# Patient Record
Sex: FEMALE | Race: OTHER | HISPANIC OR LATINO | URBAN - METROPOLITAN AREA
[De-identification: names, ages, dates, MRNs, and addresses within clinical notes are randomized per-mention and may not be internally consistent; named-entity substitution may affect disease eponyms.]

---

## 2021-01-07 ENCOUNTER — EMERGENCY (EMERGENCY)
Facility: HOSPITAL | Age: 56
LOS: 1 days | Discharge: HOME | End: 2021-01-07
Attending: EMERGENCY MEDICINE
Payer: MEDICAID

## 2021-01-07 VITALS
RESPIRATION RATE: 16 BRPM | HEIGHT: 60 IN | DIASTOLIC BLOOD PRESSURE: 60 MMHG | OXYGEN SATURATION: 98 % | TEMPERATURE: 99 F | WEIGHT: 134.92 LBS | HEART RATE: 77 BPM | SYSTOLIC BLOOD PRESSURE: 120 MMHG

## 2021-01-07 PROCEDURE — 99283 EMERGENCY DEPT VISIT LOW MDM: CPT

## 2021-01-07 NOTE — ED PROVIDER NOTE - NSFOLLOWUPINSTRUCTIONS_ED_ALL_ED_FT
normal e3ye pressure/pt refered to opthalmology but pt declined.will follow up arranged by [facility] coordinator. with her pcp.pt counseled,told to avoid bright light/topical artificial tears

## 2021-01-07 NOTE — ED PROVIDER NOTE - PATIENT PORTAL LINK FT
You can access the FollowMyHealth Patient Portal offered by [facility] by registering at the following website: [email or web address]. By joining [email or web address]’s FollowMyHealth portal, you will also be able to view your health information using other applications (apps) compatible with our system.

## 2024-03-15 ENCOUNTER — EMERGENCY (EMERGENCY)
Facility: HOSPITAL | Age: 59
LOS: 1 days | Discharge: HOME | End: 2024-03-15
Attending: STUDENT IN AN ORGANIZED HEALTH CARE EDUCATION/TRAINING PROGRAM
Payer: MEDICAID

## 2024-03-15 VITALS
OXYGEN SATURATION: 98 % | HEART RATE: 75 BPM | WEIGHT: 123.9 LBS | DIASTOLIC BLOOD PRESSURE: 66 MMHG | TEMPERATURE: 98 F | SYSTOLIC BLOOD PRESSURE: 101 MMHG | RESPIRATION RATE: 16 BRPM

## 2024-03-15 LAB
ALBUMIN SERPL ELPH-MCNC: 3.7 G/DL — SIGNIFICANT CHANGE UP (ref 3.5–5)
ALP SERPL-CCNC: 117 U/L — SIGNIFICANT CHANGE UP (ref 40–120)
ALT FLD-CCNC: 31 U/L DA — SIGNIFICANT CHANGE UP (ref 10–60)
ANION GAP SERPL CALC-SCNC: 3 MMOL/L — LOW (ref 5–17)
AST SERPL-CCNC: 43 U/L — HIGH (ref 10–40)
BASOPHILS # BLD AUTO: 0.06 K/UL — SIGNIFICANT CHANGE UP (ref 0–0.2)
BASOPHILS NFR BLD AUTO: 1.2 % — SIGNIFICANT CHANGE UP (ref 0–2)
BILIRUB SERPL-MCNC: 0.8 MG/DL — SIGNIFICANT CHANGE UP (ref 0.2–1.2)
BUN SERPL-MCNC: 13 MG/DL — SIGNIFICANT CHANGE UP (ref 7–18)
CALCIUM SERPL-MCNC: 9.5 MG/DL — SIGNIFICANT CHANGE UP (ref 8.4–10.5)
CHLORIDE SERPL-SCNC: 106 MMOL/L — SIGNIFICANT CHANGE UP (ref 96–108)
CO2 SERPL-SCNC: 29 MMOL/L — SIGNIFICANT CHANGE UP (ref 22–31)
CREAT SERPL-MCNC: 0.79 MG/DL — SIGNIFICANT CHANGE UP (ref 0.5–1.3)
EGFR: 88 ML/MIN/1.73M2 — SIGNIFICANT CHANGE UP
EOSINOPHIL # BLD AUTO: 0.07 K/UL — SIGNIFICANT CHANGE UP (ref 0–0.5)
EOSINOPHIL NFR BLD AUTO: 1.4 % — SIGNIFICANT CHANGE UP (ref 0–6)
GLUCOSE SERPL-MCNC: 103 MG/DL — HIGH (ref 70–99)
HCT VFR BLD CALC: 41.5 % — SIGNIFICANT CHANGE UP (ref 34.5–45)
HGB BLD-MCNC: 14.4 G/DL — SIGNIFICANT CHANGE UP (ref 11.5–15.5)
IMM GRANULOCYTES NFR BLD AUTO: 0.2 % — SIGNIFICANT CHANGE UP (ref 0–0.9)
LIDOCAIN IGE QN: 31 U/L — SIGNIFICANT CHANGE UP (ref 13–75)
LYMPHOCYTES # BLD AUTO: 1.72 K/UL — SIGNIFICANT CHANGE UP (ref 1–3.3)
LYMPHOCYTES # BLD AUTO: 33.3 % — SIGNIFICANT CHANGE UP (ref 13–44)
MCHC RBC-ENTMCNC: 31.4 PG — SIGNIFICANT CHANGE UP (ref 27–34)
MCHC RBC-ENTMCNC: 34.7 GM/DL — SIGNIFICANT CHANGE UP (ref 32–36)
MCV RBC AUTO: 90.4 FL — SIGNIFICANT CHANGE UP (ref 80–100)
MONOCYTES # BLD AUTO: 0.38 K/UL — SIGNIFICANT CHANGE UP (ref 0–0.9)
MONOCYTES NFR BLD AUTO: 7.4 % — SIGNIFICANT CHANGE UP (ref 2–14)
NEUTROPHILS # BLD AUTO: 2.93 K/UL — SIGNIFICANT CHANGE UP (ref 1.8–7.4)
NEUTROPHILS NFR BLD AUTO: 56.5 % — SIGNIFICANT CHANGE UP (ref 43–77)
NRBC # BLD: 0 /100 WBCS — SIGNIFICANT CHANGE UP (ref 0–0)
NT-PROBNP SERPL-SCNC: 30 PG/ML — SIGNIFICANT CHANGE UP (ref 0–125)
PLATELET # BLD AUTO: 283 K/UL — SIGNIFICANT CHANGE UP (ref 150–400)
POTASSIUM SERPL-MCNC: 4.5 MMOL/L — SIGNIFICANT CHANGE UP (ref 3.5–5.3)
POTASSIUM SERPL-SCNC: 4.5 MMOL/L — SIGNIFICANT CHANGE UP (ref 3.5–5.3)
PROT SERPL-MCNC: 7.6 G/DL — SIGNIFICANT CHANGE UP (ref 6–8.3)
RBC # BLD: 4.59 M/UL — SIGNIFICANT CHANGE UP (ref 3.8–5.2)
RBC # FLD: 12.7 % — SIGNIFICANT CHANGE UP (ref 10.3–14.5)
SODIUM SERPL-SCNC: 138 MMOL/L — SIGNIFICANT CHANGE UP (ref 135–145)
TROPONIN I, HIGH SENSITIVITY RESULT: 4.6 NG/L — SIGNIFICANT CHANGE UP
WBC # BLD: 5.17 K/UL — SIGNIFICANT CHANGE UP (ref 3.8–10.5)
WBC # FLD AUTO: 5.17 K/UL — SIGNIFICANT CHANGE UP (ref 3.8–10.5)

## 2024-03-15 PROCEDURE — 99285 EMERGENCY DEPT VISIT HI MDM: CPT

## 2024-03-15 PROCEDURE — 71046 X-RAY EXAM CHEST 2 VIEWS: CPT | Mod: 26

## 2024-03-15 RX ORDER — ACETAMINOPHEN 500 MG
650 TABLET ORAL ONCE
Refills: 0 | Status: COMPLETED | OUTPATIENT
Start: 2024-03-15 | End: 2024-03-15

## 2024-03-15 RX ORDER — KETOROLAC TROMETHAMINE 30 MG/ML
15 SYRINGE (ML) INJECTION ONCE
Refills: 0 | Status: DISCONTINUED | OUTPATIENT
Start: 2024-03-15 | End: 2024-03-15

## 2024-03-15 RX ADMIN — Medication 650 MILLIGRAM(S): at 11:02

## 2024-03-15 RX ADMIN — Medication 15 MILLIGRAM(S): at 11:02

## 2024-03-15 NOTE — ED ADULT NURSE NOTE - NSFALLRISK_ED_ALL_ED
Subjective   Patient ID: Triny is a 49 year old female.    Chief Complaint   Patient presents with   • Follow-up Hypertension     c/o frequency urination,dry mouth   • Asthma     Patient with HTN and asthma here for follow up. She feels well with the exception of urinary frequency and increased thirst    Asthma   Her past medical history is significant for asthma.       Triny has Acute upper respiratory infection; Essential hypertension; Pelvic pain in female; Adenomyosis; Status post laparoscopic hysterectomy; and Mild intermittent asthma without complication on their problem list.    Review of Systems   Constitutional: Negative.    HENT: Negative.    Eyes: Negative.    Respiratory: Negative.    Cardiovascular: Negative.    Gastrointestinal: Negative.    Endocrine: Negative.    Genitourinary: Negative.    Musculoskeletal: Negative.    Allergic/Immunologic: Negative.    Neurological: Negative.    Hematological: Negative.    Psychiatric/Behavioral: Negative.        Objective   Physical Exam  Constitutional:       Appearance: She is well-developed.   HENT:      Head: Normocephalic.      Right Ear: External ear normal.      Left Ear: External ear normal.   Eyes:      Pupils: Pupils are equal, round, and reactive to light.   Neck:      Musculoskeletal: Normal range of motion.   Cardiovascular:      Rate and Rhythm: Regular rhythm.   Pulmonary:      Effort: Pulmonary effort is normal.   Abdominal:      Palpations: Abdomen is soft.   Skin:     General: Skin is warm.   Neurological:      Mental Status: She is oriented to person, place, and time.         Assessment   NIDDM: new onset  
No

## 2024-03-15 NOTE — ED ADULT NURSE NOTE - NS ED NURSE IV DC DT
Discharge Call Back Questionnaire   How are you feeling? Any questions or concerns?   When did you get your medications? (if prescribed new medications)   When is your follow-up appointment? Do you have a ride to get to your appointment?   Do you have enough of your daily medications until your next doctor appointment?   Are you weighing yourself at home? Any changes in weight?   Have you experienced any of the following symptoms- more shortness of breath than usual, difficulty breathing when lying down, a dry hacking cough, new or worsening confusion, having difficulty thinking clearly?  DID NOT CALL. DC TO [facility].  
15-Mar-2024 13:11

## 2024-03-15 NOTE — ED PROVIDER NOTE - CLINICAL SUMMARY MEDICAL DECISION MAKING FREE TEXT BOX
58-year-old female, history of hypothyroidism, currently being treated for H pylori presenting with chief complaint of chest pain that started this morning. EKG NSR, non ischemic. VSS, afebrile.   Cardiac workup, chest x-ray.

## 2024-03-15 NOTE — ED PROVIDER NOTE - PHYSICAL EXAMINATION
Gen: NAD; well appearing  ENT: mucous membranes moist, no discharge  Neck: neck supple  Resp: CTAB, no W/R/R  CV: RRR, +S1/S2, no M/R/G  GI: Abdomen soft non-distended, NTTP, no masses  MSK: No open wounds, no bruising, no lower extremity edema  Neuro: A&Ox4, sensation nl, motor 5/5 RUE/LUE/RLE/LLE, follows commands  Ext: no edema, no deformity, warm and well-perfused  Skin: no rash or bruising

## 2024-03-15 NOTE — ED PROVIDER NOTE - NSFOLLOWUPCLINICS_GEN_ALL_ED_FT
[facility]  Otolaryngology (ENT)  [address], [address]  [place] [address]  Phone: [phone]  Fax:     [facility] - ENT  Otolaryngology (ENT)  [address] [place] [address]  Phone: [phone]  Fax:     [facility]  Otolaryngology (ENT)  [address], [facility] - 10th [place] [address]  Phone: [phone]  Fax:

## 2024-03-15 NOTE — ED PROVIDER NOTE - NSFOLLOWUPINSTRUCTIONS_ED_ALL_ED_FT
– Regrese si hay algún empeoramiento o síntomas preocupantes (alejandro más abajo).  – Seguimiento con médico de atención primaria en los próximos 5 a 7 días.  – Seguimiento con cardiólogo los próximos 2 a 3 días.  – Seguimiento con otorrinolaringólogo en los próximos 2 a 3 días.  El dolor de pecho también puede ser causado por afecciones potencialmente mortales, eleonora un ataque cardíaco o un coágulo de debbi en los pulmones.    INSTRUCCIONES DE DESCARGA:  Llame al 911 si:  •Tiene alguno de los siguientes signos de un ataque cardíaco: ?Opresión, presión o dolor en el pecho.  También puede tener cualquiera de los siguientes síntomas: Malestar o dolor en la espalda, el norma, la mandíbula, el estómago o el brazo.  ?Dificultad para respirar  ?Náuseas o vómitos  ?Aturdimiento o sudor frío repentino  Busque atención inmediatamente si:  •Tiene malestar en el pecho que empeora, incluso con medicamentos.  •Tose o vomita debbi.  •Liliana evacuaciones intestinales son negras o con debbi.  •No puede dejar de vomitar o le duele tragar.  Comuníquese con montilla proveedor de atención médica si:  •Tiene preguntas o inquietudes sobre montilla condición o atención.  Medicamentos:  •Es posible que le administren medicamentos para tratar la causa de montilla dolor en el pecho. Los ejemplos incluyen analgésicos, medicamentos para la ansiedad o medicamentos para aumentar el flujo sanguíneo al corazón.  •No tome ciertos medicamentos sin consultar haris con montilla proveedor de atención médica. Estos incluyen SONALI, suplementos herbales o vitamínicos u hormonas (estrógeno o progestina).  •[place] montilla medicamento según las indicaciones. Comuníquese con montilla proveedor de atención médica si silvio que montilla medicamento no le está ayudando o si tiene efectos secundarios. Dígale si es alérgico a algún medicamento. Mantenga taylor lista de los medicamentos, vitaminas y hierbas que camryn. Incluya las cantidades y cuándo y por qué las camryn. Lleve la lista o los frascos de pastillas a las visitas de seguimiento. Lleve consigo montilla lista de medicamentos en carlos alberto de taylor emergencia.  Ilene un seguimiento con montilla proveedor de atención médica dentro de las 72 horas, o según las indicaciones: Es posible que deba regresar para realizarse más pruebas para encontrar la causa de montilla dolor en el pecho. Es posible que lo deriven a un especialista, eleonora un cardiólogo o un gastroenterólogo. Escriba liliana preguntas para recordar hacerlas caesar liliana visitas.  Consejos para taylor sergio saludable: Las siguientes son pautas generales para taylor sergio saludable. Si montilla dolor de pecho es causado por un problema cardíaco, montilla proveedor de atención médica le dará pautas específicas a seguir.  •No fume. La nicotina y otras sustancias químicas de los cigarrillos y los puros pueden provocar daños pulmonares y cardíacos. Pídale información a montilla proveedor de atención médica si actualmente fuma y necesita ayuda para dejar de fumar. Los cigarrillos electrónicos o el tabaco sin humo todavía contienen nicotina. Hable con montilla proveedor de atención médica antes de usar estos productos.  •Coma taylor variedad de alimentos saludables, bajos en grasa y bajos en sal. Los alimentos saludables incluyen frutas, verduras, panes integrales, productos lácteos bajos en grasa, frijoles, jessica magras y pescado. Solicite más información sobre taylor dieta saludable para el corazón.  Monica mucha agua todos los días. Tu cuerpo está compuesto principalmente de agua. El agua ayuda al cuerpo a controlar la temperatura y la presión arterial. Pregúntele a montilla proveedor de atención médica cuánta agua debe beber todos los días.  •Preguntar sobre la actividad. Montilla proveedor de atención médica le indicará qué actividades limitar o evitar. Pregunte cuándo puede conducir, regresar al trabajo y tener relaciones sexuales. Pregunta por el mejor plan de ejercicios para ti.  •Mantener un peso saludable. Pregúntele a montilla proveedor de atención médica cuánto debe pesar. Pídale que le ayude a crear un plan de pérdida de peso si tiene sobrepeso.  Si tiene un stent:  •Lleve consigo montilla tarjeta de stent en todo momento.  •Informe a todos los proveedores de atención médica que tiene un stent.      [facility]  General Internal Medicine  [address] [place] [address]  Phone: [phone]  Fax:     [facility]  Internal Medicine  [address] [place] [address]  Phone: [phone]  Fax: [phone]    [facility]  Cardiology  [address] [place] [id number]  Phone: [phone]

## 2024-03-15 NOTE — ED PROVIDER NOTE - PROGRESS NOTE DETAILS
Ayad RAMOS:   Patient reassessed at bedside, feeling well.  Denies any active chest pain.   EKG normal sinus rhythm, negative troponin.  Normal chest x-ray.  Will discharge.

## 2024-03-15 NOTE — ED PROVIDER NOTE - OBJECTIVE STATEMENT
58-year-old female, history of hypothyroidism presenting with chief complaint of chest pain that started this morning.  Patient states that pain is left-sided, and lasted for around 30 minutes, was described as sharp.  Patient denies any chest pain  currently.  She is on a course of Flagyl, clarithromycin, and omeprazole for H. pylori.  She is denying any shortness of breath.  States minor diarrhea.   Endorses minor left mandibular pain,  denies any neck stiffness.  No nausea, vomiting, fever or chills, no abdominal pain.

## 2024-03-15 NOTE — ED PROVIDER NOTE - PATIENT PORTAL LINK FT
You can access the FollowMyHealth Patient Portal offered by [facility] by registering at the following website: [email or web address]. By joining [employer]’s FollowMyHealth portal, you will also be able to view your health information using other applications (apps) compatible with our system.

## 2024-10-16 ENCOUNTER — EMERGENCY (EMERGENCY)
Facility: HOSPITAL | Age: 59
LOS: 1 days | Discharge: HOME | End: 2024-10-16
Admitting: EMERGENCY MEDICINE
Payer: MEDICAID

## 2024-10-16 VITALS
DIASTOLIC BLOOD PRESSURE: 80 MMHG | HEIGHT: 61 IN | TEMPERATURE: 98 F | SYSTOLIC BLOOD PRESSURE: 121 MMHG | OXYGEN SATURATION: 99 % | WEIGHT: 121.92 LBS | HEART RATE: 64 BPM | RESPIRATION RATE: 16 BRPM

## 2024-10-16 PROBLEM — I10 ESSENTIAL (PRIMARY) HYPERTENSION: Chronic | Status: ACTIVE | Noted: 2024-03-15

## 2024-10-16 PROCEDURE — 99282 EMERGENCY DEPT VISIT SF MDM: CPT

## 2024-10-16 PROCEDURE — 99284 EMERGENCY DEPT VISIT MOD MDM: CPT

## 2024-10-16 RX ORDER — IBUPROFEN 200 MG
1 TABLET ORAL
Qty: 15 | Refills: 0
Start: 2024-10-16 | End: 2024-10-20

## 2024-10-16 RX ORDER — AMOXICILLIN 500 MG/1
1 CAPSULE ORAL
Qty: 14 | Refills: 0
Start: 2024-10-16 | End: 2024-10-22

## 2024-10-18 DIAGNOSIS — K13.79 OTHER LESIONS OF ORAL MUCOSA: ICD-10-CM
